# Patient Record
Sex: MALE | Race: WHITE | Employment: FULL TIME | URBAN - METROPOLITAN AREA
[De-identification: names, ages, dates, MRNs, and addresses within clinical notes are randomized per-mention and may not be internally consistent; named-entity substitution may affect disease eponyms.]

---

## 2019-06-13 ENCOUNTER — APPOINTMENT (OUTPATIENT)
Dept: GENERAL RADIOLOGY | Age: 44
End: 2019-06-13
Payer: COMMERCIAL

## 2019-06-13 ENCOUNTER — APPOINTMENT (OUTPATIENT)
Dept: CT IMAGING | Age: 44
End: 2019-06-13
Payer: COMMERCIAL

## 2019-06-13 ENCOUNTER — HOSPITAL ENCOUNTER (EMERGENCY)
Age: 44
Discharge: HOME | End: 2019-06-13
Attending: EMERGENCY MEDICINE
Payer: COMMERCIAL

## 2019-06-13 VITALS
SYSTOLIC BLOOD PRESSURE: 140 MMHG | WEIGHT: 230 LBS | HEIGHT: 71 IN | TEMPERATURE: 98.2 F | HEART RATE: 72 BPM | OXYGEN SATURATION: 97 % | RESPIRATION RATE: 14 BRPM | DIASTOLIC BLOOD PRESSURE: 96 MMHG | BODY MASS INDEX: 32.2 KG/M2

## 2019-06-13 DIAGNOSIS — S63.501A SPRAIN OF RIGHT WRIST, INITIAL ENCOUNTER: ICD-10-CM

## 2019-06-13 DIAGNOSIS — S80.01XA CONTUSION OF RIGHT KNEE, INITIAL ENCOUNTER: ICD-10-CM

## 2019-06-13 DIAGNOSIS — S06.0X1A CONCUSSION WITH LOSS OF CONSCIOUSNESS OF 30 MINUTES OR LESS, INITIAL ENCOUNTER: ICD-10-CM
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
## 2019-06-13 DIAGNOSIS — S00.03XA CONTUSION OF SCALP, INITIAL ENCOUNTER: ICD-10-CM

## 2019-06-13 LAB
ABO/RH: NORMAL
ALBUMIN SERPL-MCNC: 4 GM/DL (ref 3.4–5)
ALP BLD-CCNC: 68 IU/L (ref 40–129)
ALT SERPL-CCNC: 27 U/L (ref 10–40)
ANION GAP SERPL CALCULATED.3IONS-SCNC: 13 MMOL/L (ref 4–16)
ANTIBODY SCREEN: NEGATIVE
AST SERPL-CCNC: 20 IU/L (ref 15–37)
BASOPHILS ABSOLUTE: 0.1 K/CU MM
BASOPHILS RELATIVE PERCENT: 1 % (ref 0–1)
BILIRUB SERPL-MCNC: 0.6 MG/DL (ref 0–1)
BUN BLDV-MCNC: 18 MG/DL (ref 6–23)
CALCIUM SERPL-MCNC: 9.2 MG/DL (ref 8.3–10.6)
CHLORIDE BLD-SCNC: 99 MMOL/L (ref 99–110)
CO2: 22 MMOL/L (ref 21–32)
CREAT SERPL-MCNC: 1 MG/DL (ref 0.9–1.3)
DIFFERENTIAL TYPE: ABNORMAL
EOSINOPHILS ABSOLUTE: 0.2 K/CU MM
EOSINOPHILS RELATIVE PERCENT: 2.4 % (ref 0–3)
GFR AFRICAN AMERICAN: >60 ML/MIN/1.73M2
GFR NON-AFRICAN AMERICAN: >60 ML/MIN/1.73M2
GLUCOSE BLD-MCNC: 364 MG/DL (ref 70–99)
HCT VFR BLD CALC: 41 % (ref 42–52)
HEMOGLOBIN: 14.7 GM/DL (ref 13.5–18)
IMMATURE NEUTROPHIL %: 0.6 % (ref 0–0.43)
INR BLD: 1.08 INDEX
LIPASE: 26 IU/L (ref 13–60)
LYMPHOCYTES ABSOLUTE: 1.7 K/CU MM
LYMPHOCYTES RELATIVE PERCENT: 23.4 % (ref 24–44)
MCH RBC QN AUTO: 31 PG (ref 27–31)
MCHC RBC AUTO-ENTMCNC: 35.9 % (ref 32–36)
MCV RBC AUTO: 86.5 FL (ref 78–100)
MONOCYTES ABSOLUTE: 0.4 K/CU MM
MONOCYTES RELATIVE PERCENT: 6 % (ref 0–4)
NUCLEATED RBC %: 0 %
PDW BLD-RTO: 11.7 % (ref 11.7–14.9)
PLATELET # BLD: 221 K/CU MM (ref 140–440)
PMV BLD AUTO: 9.3 FL (ref 7.5–11.1)
POTASSIUM SERPL-SCNC: 3.8 MMOL/L (ref 3.5–5.1)
PROTHROMBIN TIME: 12.3 SECONDS (ref 9.12–12.5)
RBC # BLD: 4.74 M/CU MM (ref 4.6–6.2)
SEGMENTED NEUTROPHILS ABSOLUTE COUNT: 4.8 K/CU MM
SEGMENTED NEUTROPHILS RELATIVE PERCENT: 66.6 % (ref 36–66)
SODIUM BLD-SCNC: 134 MMOL/L (ref 135–145)
TOTAL IMMATURE NEUTOROPHIL: 0.04 K/CU MM
TOTAL NUCLEATED RBC: 0 K/CU MM
TOTAL PROTEIN: 6.5 GM/DL (ref 6.4–8.2)
WBC # BLD: 7.2 K/CU MM (ref 4–10.5)

## 2019-06-13 PROCEDURE — 72170 X-RAY EXAM OF PELVIS: CPT

## 2019-06-13 PROCEDURE — 36415 COLL VENOUS BLD VENIPUNCTURE: CPT

## 2019-06-13 PROCEDURE — 6360000004 HC RX CONTRAST MEDICATION: Performed by: EMERGENCY MEDICINE

## 2019-06-13 PROCEDURE — 83690 ASSAY OF LIPASE: CPT

## 2019-06-13 PROCEDURE — 85610 PROTHROMBIN TIME: CPT

## 2019-06-13 PROCEDURE — 86900 BLOOD TYPING SEROLOGIC ABO: CPT

## 2019-06-13 PROCEDURE — 73560 X-RAY EXAM OF KNEE 1 OR 2: CPT

## 2019-06-13 PROCEDURE — 85025 COMPLETE CBC W/AUTO DIFF WBC: CPT

## 2019-06-13 PROCEDURE — 73110 X-RAY EXAM OF WRIST: CPT

## 2019-06-13 PROCEDURE — 74177 CT ABD & PELVIS W/CONTRAST: CPT

## 2019-06-13 PROCEDURE — 70450 CT HEAD/BRAIN W/O DYE: CPT

## 2019-06-13 PROCEDURE — 86850 RBC ANTIBODY SCREEN: CPT

## 2019-06-13 PROCEDURE — 86901 BLOOD TYPING SEROLOGIC RH(D): CPT

## 2019-06-13 PROCEDURE — 6370000000 HC RX 637 (ALT 250 FOR IP): Performed by: EMERGENCY MEDICINE

## 2019-06-13 PROCEDURE — 72131 CT LUMBAR SPINE W/O DYE: CPT

## 2019-06-13 PROCEDURE — 72125 CT NECK SPINE W/O DYE: CPT

## 2019-06-13 PROCEDURE — 72128 CT CHEST SPINE W/O DYE: CPT

## 2019-06-13 PROCEDURE — 99284 EMERGENCY DEPT VISIT MOD MDM: CPT

## 2019-06-13 PROCEDURE — 71045 X-RAY EXAM CHEST 1 VIEW: CPT

## 2019-06-13 PROCEDURE — 71275 CT ANGIOGRAPHY CHEST: CPT

## 2019-06-13 PROCEDURE — 80053 COMPREHEN METABOLIC PANEL: CPT

## 2019-06-13 RX ORDER — OMEPRAZOLE 40 MG/1
40 CAPSULE, DELAYED RELEASE ORAL DAILY
COMMUNITY

## 2019-06-13 RX ORDER — HYDROCODONE BITARTRATE AND ACETAMINOPHEN 5; 325 MG/1; MG/1
1 TABLET ORAL ONCE
Status: COMPLETED | OUTPATIENT
Start: 2019-06-13 | End: 2019-06-13

## 2019-06-13 RX ORDER — CYCLOBENZAPRINE HCL 10 MG
10 TABLET ORAL 3 TIMES DAILY PRN
COMMUNITY

## 2019-06-13 RX ORDER — FENOFIBRATE 160 MG/1
160 TABLET ORAL DAILY
COMMUNITY

## 2019-06-13 RX ORDER — OMEGA-3-ACID ETHYL ESTERS 1 G/1
2 CAPSULE, LIQUID FILLED ORAL DAILY
COMMUNITY

## 2019-06-13 RX ORDER — NAPROXEN 500 MG/1
500 TABLET ORAL 2 TIMES DAILY PRN
Qty: 60 TABLET | Refills: 0 | Status: SHIPPED | OUTPATIENT
Start: 2019-06-13

## 2019-06-13 RX ADMIN — HYDROCODONE BITARTRATE AND ACETAMINOPHEN 1 TABLET: 5; 325 TABLET ORAL at 15:33

## 2019-06-13 RX ADMIN — IOPAMIDOL 80 ML: 755 INJECTION, SOLUTION INTRAVENOUS at 14:01

## 2019-06-13 ASSESSMENT — PAIN SCALES - GENERAL
PAINLEVEL_OUTOF10: 8
PAINLEVEL_OUTOF10: 10

## 2019-06-13 ASSESSMENT — PAIN DESCRIPTION - PAIN TYPE: TYPE: ACUTE PAIN
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
## 2019-06-13 NOTE — ED NOTES
Pt returned from CT at this time. Pt speaking in full sentences at this time.       Mic Castro RN  06/13/19 [id number]

## 2019-06-13 NOTE — PROGRESS NOTES
Medication History  [facility]    Patient Name: Merissa Carmichael 1975     Medication history has been completed by: Ronda Hebert CPhT    Source(s) of information: Patient and Insurance claims    Primary Care Physician: No primary care provider on file. Pharmacy: [facility]    Allergies as of 06/13/2019    (No Known Allergies)        Prior to Admission medications    Medication Sig Start Date End Date Taking? Authorizing Provider   metFORMIN (GLUCOPHAGE) 500 MG tablet Take 500 mg by mouth 2 times daily (with meals)   Yes Historical Provider, MD   fenofibrate 160 MG tablet Take 160 mg by mouth daily   Yes Historical Provider, MD   omega-3 acid ethyl esters (LOVAZA) 1 g capsule Take 2 g by mouth daily   Yes Historical Provider, MD   omeprazole (PRILOSEC) 40 MG delayed release capsule Take 40 mg by mouth daily   Yes Historical Provider, MD   cyclobenzaprine (FLEXERIL) 10 MG tablet Take 10 mg by mouth 3 times daily as needed for Muscle spasms   Yes Historical Provider, MD       Medications added or changed (ex. new medication, dosage change, interval change, formulation change):   Entire medication list    Other Comments:  Reviewed and updated med list per patient and verified with claims    To my knowledge the above medication history is accurate as of 6/13/2019 2:09 PM.   Ronda Hebert CPhT   6/13/2019 2:09 PM
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
## 2019-06-13 NOTE — ED NOTES
Pt returned from CT. Pt reporting \"pain all over\" especially reporting a headache, right knee, and right hand pain. Pt also reporting neck and back pain. Pt describes a \"burning\" sensation in his palm and thumb region. Pt reports full sensation in extremities otherwise. Strong pedal pulses palpated. Strong radial pulses. Pt reports shortness of breath at this time. Respiratory rate of 25 at this time. Oxygen sat 98% on 2L NC. Chest rise symmetrical. Pt denying chest pain at this time. Sinus rhythm on the monitor with HR 67. Pt current /79 with MAP of 98. No obvious deformities noted. No bleeding noted. Redness noted to right knee and very tender to the touch. Pt does report some blurred vision. PERRLA. Pt alert and oriented to person, place, time, and situation. Speaking in clear and full sentences.       Keke Adams RN  06/13/19 8645
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
## 2019-06-13 NOTE — ED NOTES
Pt alert at this time. Pupils equal and reactive. Portable radiology imaging completed and patient transported to CT at this time. Pt in c-collar.      Amanda Rivas RN  06/13/19 [address], RN  06/13/19 1330

## 2019-06-13 NOTE — LETTER
[facility]  [place] [id number]  Phone: [phone]  Fax: [phone]               June 13, 2019    Patient: Penny Javed   YOB: 1975   Date of Visit: 6/13/2019       To Whom It May Concern:    Penny Javed was seen and treated in our emergency department on 6/13/2019. He may return to work on 6/16/2019.       Sincerely,       Stefani VASQUEZ        Signature:__________________________________
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
## 2019-09-30 ENCOUNTER — HOSPITAL ENCOUNTER (EMERGENCY)
Age: 44
Discharge: HOME | End: 2019-09-30
Payer: COMMERCIAL

## 2019-09-30 ENCOUNTER — APPOINTMENT (OUTPATIENT)
Dept: GENERAL RADIOLOGY | Age: 44
End: 2019-09-30
Payer: COMMERCIAL

## 2019-09-30 VITALS
TEMPERATURE: 98.3 F | DIASTOLIC BLOOD PRESSURE: 97 MMHG | HEIGHT: 71 IN | WEIGHT: 230 LBS | RESPIRATION RATE: 18 BRPM | OXYGEN SATURATION: 99 % | SYSTOLIC BLOOD PRESSURE: 138 MMHG | BODY MASS INDEX: 32.2 KG/M2 | HEART RATE: 87 BPM

## 2019-09-30 DIAGNOSIS — S61.512A LACERATION OF LEFT WRIST, INITIAL ENCOUNTER: Primary | ICD-10-CM

## 2019-09-30 PROCEDURE — 6370000000 HC RX 637 (ALT 250 FOR IP): Performed by: PHYSICIAN ASSISTANT

## 2019-09-30 PROCEDURE — 73110 X-RAY EXAM OF WRIST: CPT

## 2019-09-30 PROCEDURE — 4500000027

## 2019-09-30 PROCEDURE — 2500000003 HC RX 250 WO HCPCS: Performed by: PHYSICIAN ASSISTANT

## 2019-09-30 PROCEDURE — 99283 EMERGENCY DEPT VISIT LOW MDM: CPT

## 2019-09-30 RX ORDER — LIDOCAINE HYDROCHLORIDE 20 MG/ML
10 INJECTION, SOLUTION INFILTRATION; PERINEURAL ONCE
Status: COMPLETED | OUTPATIENT
Start: 2019-09-30 | End: 2019-09-30

## 2019-09-30 RX ADMIN — LIDOCAINE HYDROCHLORIDE 10 ML: 20 INJECTION, SOLUTION INFILTRATION; PERINEURAL at 13:18

## 2019-09-30 RX ADMIN — Medication 3 ML: at 12:17

## 2019-09-30 ASSESSMENT — PAIN DESCRIPTION - PAIN TYPE: TYPE: ACUTE PAIN

## 2019-09-30 ASSESSMENT — PAIN SCALES - GENERAL: PAINLEVEL_OUTOF10: 6

## 2019-09-30 ASSESSMENT — PAIN DESCRIPTION - LOCATION: LOCATION: WRIST

## 2019-09-30 ASSESSMENT — PAIN DESCRIPTION - ORIENTATION: ORIENTATION: LEFT

## 2023-05-10 ENCOUNTER — HOSPITAL ENCOUNTER (EMERGENCY)
Age: 48
Discharge: HOME | End: 2023-05-10
Attending: EMERGENCY MEDICINE
Payer: COMMERCIAL

## 2023-05-10 ENCOUNTER — APPOINTMENT (OUTPATIENT)
Dept: GENERAL RADIOLOGY | Age: 48
End: 2023-05-10
Payer: COMMERCIAL

## 2023-05-10 VITALS
HEIGHT: 71 IN | RESPIRATION RATE: 18 BRPM | WEIGHT: 220 LBS | DIASTOLIC BLOOD PRESSURE: 79 MMHG | SYSTOLIC BLOOD PRESSURE: 117 MMHG | BODY MASS INDEX: 30.8 KG/M2 | OXYGEN SATURATION: 96 % | TEMPERATURE: 97.8 F | HEART RATE: 83 BPM

## 2023-05-10 DIAGNOSIS — M54.9 BACK PAIN, UNSPECIFIED BACK LOCATION, UNSPECIFIED BACK PAIN LATERALITY, UNSPECIFIED CHRONICITY: Primary | ICD-10-CM

## 2023-05-10 DIAGNOSIS — M51.37 DDD (DEGENERATIVE DISC DISEASE), LUMBOSACRAL: ICD-10-CM

## 2023-05-10 PROCEDURE — 99284 EMERGENCY DEPT VISIT MOD MDM: CPT

## 2023-05-10 PROCEDURE — 72100 X-RAY EXAM L-S SPINE 2/3 VWS: CPT

## 2023-05-10 PROCEDURE — 6360000002 HC RX W HCPCS: Performed by: EMERGENCY MEDICINE

## 2023-05-10 PROCEDURE — 96372 THER/PROPH/DIAG INJ SC/IM: CPT

## 2023-05-10 PROCEDURE — 6370000000 HC RX 637 (ALT 250 FOR IP): Performed by: EMERGENCY MEDICINE

## 2023-05-10 RX ORDER — ACETAMINOPHEN 325 MG/1
650 TABLET ORAL EVERY 6 HOURS PRN
Qty: 120 TABLET | Refills: 3 | Status: SHIPPED | OUTPATIENT
Start: 2023-05-10

## 2023-05-10 RX ORDER — KETOROLAC TROMETHAMINE 30 MG/ML
30 INJECTION, SOLUTION INTRAMUSCULAR; INTRAVENOUS ONCE
Status: COMPLETED | OUTPATIENT
Start: 2023-05-10 | End: 2023-05-10

## 2023-05-10 RX ORDER — LIDOCAINE 4 G/G
1 PATCH TOPICAL DAILY
Status: DISCONTINUED | OUTPATIENT
Start: 2023-05-10 | End: 2023-05-10 | Stop reason: HOSPADM

## 2023-05-10 RX ORDER — ACETAMINOPHEN 500 MG
1000 TABLET ORAL ONCE
Status: COMPLETED | OUTPATIENT
Start: 2023-05-10 | End: 2023-05-10

## 2023-05-10 RX ORDER — CYCLOBENZAPRINE HCL 10 MG
10 TABLET ORAL ONCE
Status: COMPLETED | OUTPATIENT
Start: 2023-05-10 | End: 2023-05-10

## 2023-05-10 RX ORDER — CYCLOBENZAPRINE HCL 10 MG
10 TABLET ORAL NIGHTLY PRN
Qty: 10 TABLET | Refills: 0 | Status: SHIPPED | OUTPATIENT
Start: 2023-05-10 | End: 2023-05-20

## 2023-05-10 RX ORDER — NAPROXEN 500 MG/1
500 TABLET ORAL 2 TIMES DAILY
Qty: 60 TABLET | Refills: 0 | Status: SHIPPED | OUTPATIENT
Start: 2023-05-10

## 2023-05-10 RX ORDER — LIDOCAINE 4 G/G
1 PATCH TOPICAL DAILY
Qty: 30 PATCH | Refills: 0 | Status: SHIPPED | OUTPATIENT
Start: 2023-05-10 | End: 2023-06-09

## 2023-05-10 RX ADMIN — KETOROLAC TROMETHAMINE 30 MG: 30 INJECTION, SOLUTION INTRAMUSCULAR; INTRAVENOUS at 13:16

## 2023-05-10 RX ADMIN — CYCLOBENZAPRINE 10 MG: 10 TABLET, FILM COATED ORAL at 13:13

## 2023-05-10 RX ADMIN — ACETAMINOPHEN 1000 MG: 500 TABLET ORAL at 13:13

## 2023-05-10 ASSESSMENT — PAIN DESCRIPTION - DESCRIPTORS: DESCRIPTORS: SHARP

## 2023-05-10 ASSESSMENT — ENCOUNTER SYMPTOMS
RESPIRATORY NEGATIVE: 1
EYES NEGATIVE: 1
ALLERGIC/IMMUNOLOGIC NEGATIVE: 1
BACK PAIN: 1
GASTROINTESTINAL NEGATIVE: 1

## 2023-05-10 ASSESSMENT — PAIN DESCRIPTION - ORIENTATION: ORIENTATION: RIGHT

## 2023-05-10 ASSESSMENT — PAIN DESCRIPTION - LOCATION
LOCATION: BACK
LOCATION: BACK

## 2023-05-10 ASSESSMENT — PAIN - FUNCTIONAL ASSESSMENT: PAIN_FUNCTIONAL_ASSESSMENT: PREVENTS OR INTERFERES WITH ALL ACTIVE AND SOME PASSIVE ACTIVITIES

## 2023-05-10 ASSESSMENT — PAIN SCALES - GENERAL
PAINLEVEL_OUTOF10: 10
PAINLEVEL_OUTOF10: 8

## 2023-05-10 NOTE — ED NOTES
Discharge instructions given, pt informed prescriptions were sent to pharmacy, he voiced understanding. Ambulatory to exit without incident.      Alex Mcgregor, RN  05/10/23 7639

## 2023-05-10 NOTE — ED NOTES
Pt to bed from waiting room, Dr. Jeramy Adame at bedside for initial exam     Dania Nugent RN  05/10/23 4002

## 2023-05-10 NOTE — ED PROVIDER NOTES
(if obtained):  [] The following radiograph was interpreted by myself in the absence of a radiologist:  [x] Radiologist's Report Reviewed:    Xray L spine    EKG (if obtained): (All EKG's are interpreted by myself in the absence of a cardiologist)    MDM:    Patient here with complaint of low back pain sciatica. Again patient states 2 days ago he was moving into a new house when he bent down to look for rodents that were in the house and he states he heard a pop in his back. Ever since and having right low back pain with radiation down his right leg. History of sciatica down his left leg. He denies any fevers nausea vomiting chest pain shortness of breath weakness bowel bladder incontinence saddle anesthesia. No IV drug abuse no history of back surgeries no history of cancer. He has been taking ibuprofen no relief. Constant pain hurts to move and touch. On exam he appears well vital signs are stable on exam he does have right low back pain there is no rashes no trauma no erythema or swelling he has a positive straight leg test on the right paresthesias subjectively down right leg. He has 2 pulses no compartment syndrome signs no weakness. Again no bowel bladder incontinence no saddle anesthesia. Currently in a freestanding ER I do not have MRI here I will check x-ray of his L-spine I will give him Toradol Tylenol Flexeril Lidoderm patch. Likely sciatica, lumbar strain, sprain, herniated disc he does have a history of herniated disc in his L-spine x3. He has no other red flag symptoms. Patient recheck doing well.   X-ray shows degenerative disc disease no obvious fracture etc.  I do think patient likely has sciatica, herniated disc I do not have MRI here I do not think needs emergent MRI healthy has cauda equina epidural abscess hematoma osteomyelitis etc.  Patient otherwise stable well-appearing discharge home with Flexeril Naprosyn Tylenol Lidoderm patch he was given return precautions and follow-up

## 2023-11-10 ENCOUNTER — HOSPITAL ENCOUNTER (EMERGENCY)
Age: 48
Discharge: HOME | End: 2023-11-10
Attending: EMERGENCY MEDICINE
Payer: COMMERCIAL

## 2023-11-10 ENCOUNTER — APPOINTMENT (OUTPATIENT)
Dept: GENERAL RADIOLOGY | Age: 48
End: 2023-11-10
Payer: COMMERCIAL

## 2023-11-10 VITALS
WEIGHT: 230 LBS | HEIGHT: 71 IN | DIASTOLIC BLOOD PRESSURE: 87 MMHG | HEART RATE: 89 BPM | SYSTOLIC BLOOD PRESSURE: 161 MMHG | BODY MASS INDEX: 32.2 KG/M2 | OXYGEN SATURATION: 100 % | TEMPERATURE: 97.9 F | RESPIRATION RATE: 16 BRPM

## 2023-11-10 DIAGNOSIS — S90.111A CONTUSION OF RIGHT GREAT TOE WITHOUT DAMAGE TO NAIL, INITIAL ENCOUNTER: Primary | ICD-10-CM

## 2023-11-10 PROCEDURE — 99283 EMERGENCY DEPT VISIT LOW MDM: CPT

## 2023-11-10 PROCEDURE — 73660 X-RAY EXAM OF TOE(S): CPT

## 2023-11-10 ASSESSMENT — ENCOUNTER SYMPTOMS
RHINORRHEA: 0
VOICE CHANGE: 0
EYE REDNESS: 0
CHEST TIGHTNESS: 0
ABDOMINAL DISTENTION: 0
DIARRHEA: 0
SINUS PRESSURE: 0
NAUSEA: 0
BACK PAIN: 0
BLOOD IN STOOL: 0
CONSTIPATION: 0
EYE DISCHARGE: 0
VOMITING: 0
SORE THROAT: 0
ANAL BLEEDING: 0
EYE ITCHING: 0
ABDOMINAL PAIN: 0
TROUBLE SWALLOWING: 0
WHEEZING: 0
EYE PAIN: 0
STRIDOR: 0
PHOTOPHOBIA: 0
FACIAL SWELLING: 0
COUGH: 0
SHORTNESS OF BREATH: 0

## 2023-11-10 NOTE — DISCHARGE INSTRUCTIONS
Weight bearing and activity as tolerated. Tylenol and/or Ibuprofen as needed, as directed for pain. Watch for evidence of infection and return if any further problems or concerns.

## 2023-11-10 NOTE — ED NOTES
Pt reports he dropped a piece of metal on his rt foot yesterday , swelling and bruse noted.  Pt concerned due to his diabeties     Dayana Hoffman RN  11/10/23 2195

## 2023-11-10 NOTE — ED PROVIDER NOTES
Smokeless tobacco: Never   Substance and Sexual Activity    Alcohol use: Yes     Comment: Occ    Drug use: Yes     Types: Marijuana [place])    Sexual activity: Not on file   Other Topics Concern    Not on file   Social History Narrative    Not on file     Social Determinants of Health     Financial Resource Strain: Not on file   Food Insecurity: Not on file   Transportation Needs: Not on file   Physical Activity: Not on file   Stress: Not on file   Social Connections: Not on file   Intimate Partner Violence: Not on file   Housing Stability: Not on file         Review of Systems   Constitutional:  Negative for activity change, appetite change, chills, diaphoresis, fatigue and fever. HENT: Negative. Negative for congestion, dental problem, ear pain, facial swelling, rhinorrhea, sinus pressure, sneezing, sore throat, tinnitus, trouble swallowing and voice change. Eyes:  Negative for photophobia, pain, discharge, redness, itching and visual disturbance. Respiratory:  Negative for cough, chest tightness, shortness of breath, wheezing and stridor. Cardiovascular:  Negative for chest pain, palpitations and leg swelling. Gastrointestinal:  Negative for abdominal distention, abdominal pain, anal bleeding, blood in stool, constipation, diarrhea, nausea and vomiting. Genitourinary:  Negative for difficulty urinating, dysuria, frequency, hematuria, penile discharge, testicular pain and urgency. Musculoskeletal:  Positive for arthralgias (right great toe). Negative for back pain, joint swelling, neck pain and neck stiffness. Skin:  Negative for rash and wound. Neurological:  Negative for dizziness, syncope, facial asymmetry, speech difficulty, weakness, numbness and headaches. Hematological:  Does not bruise/bleed easily. Psychiatric/Behavioral:  Negative for agitation, confusion, hallucinations, self-injury, sleep disturbance and suicidal ideas. The patient is not nervous/anxious.     All other systems

## 2024-04-10 ENCOUNTER — HOSPITAL ENCOUNTER (EMERGENCY)
Age: 49
Discharge: HOME | End: 2024-04-10
Attending: EMERGENCY MEDICINE
Payer: COMMERCIAL

## 2024-04-10 VITALS
DIASTOLIC BLOOD PRESSURE: 87 MMHG | HEIGHT: 71 IN | BODY MASS INDEX: 32.2 KG/M2 | OXYGEN SATURATION: 99 % | WEIGHT: 230 LBS | HEART RATE: 83 BPM | RESPIRATION RATE: 16 BRPM | TEMPERATURE: 97.2 F | SYSTOLIC BLOOD PRESSURE: 143 MMHG

## 2024-04-10 DIAGNOSIS — S90.822A BLISTER OF LEFT FOOT, INITIAL ENCOUNTER: Primary | ICD-10-CM

## 2024-04-10 PROCEDURE — 99283 EMERGENCY DEPT VISIT LOW MDM: CPT

## 2024-04-10 RX ORDER — GLIPIZIDE 5 MG/1
5 TABLET ORAL
COMMUNITY

## 2024-04-10 ASSESSMENT — LIFESTYLE VARIABLES
HOW OFTEN DO YOU HAVE A DRINK CONTAINING ALCOHOL: NEVER
HOW MANY STANDARD DRINKS CONTAINING ALCOHOL DO YOU HAVE ON A TYPICAL DAY: PATIENT DOES NOT DRINK

## 2024-04-10 ASSESSMENT — PAIN DESCRIPTION - ORIENTATION: ORIENTATION: LEFT

## 2024-04-10 ASSESSMENT — PAIN SCALES - GENERAL: PAINLEVEL_OUTOF10: 10

## 2024-04-10 ASSESSMENT — PAIN - FUNCTIONAL ASSESSMENT: PAIN_FUNCTIONAL_ASSESSMENT: 0-10

## 2024-04-10 ASSESSMENT — PAIN DESCRIPTION - LOCATION: LOCATION: FOOT

## 2024-04-12 NOTE — ED PROVIDER NOTES
No data to display    Independent Imaging Interpretation by me: NA    Chronic conditions affecting care: DM    Discussion with Other Profesionals : None    Social Determinants : None    Records Reviewed : None    Disposition Considerations (tests considered but not done, Shared Decision Making, Pt Expectation of Test or Tx.):   Appropriate for outpatient management      I discussed specific signs and symptoms and when to return to the emergency department as well as need for close outpatient follow-up.  Questions sought and answered with the patient and significant other. They voice understanding and agree with plan.    I am the Primary Clinician of Record.            Clinical Impression:  1. Blister of left foot, initial encounter      Disposition referral (if applicable):  Nash Swann PA  [address]  [facility] [id number] [phone]    Schedule an appointment as soon as possible for a visit       [facility]  [address] [facility] [address]  [phone]  Today  If symptoms worsen    Disposition medications (if applicable):  Discharge Medication List as of 4/10/2024  2:54 PM        START taking these medications    Details   Adhesive Bandages (MOLESKIN FOAM) PADS 2 times daily Starting Wed 4/10/2024, Disp-30 each, R-0, Normal      Bacitracin-Polymyxin B (NEOSPORIN) [id number] UNIT/GM OINT Apply 1 g topically in the morning and at bedtime, Disp-14.2 g, R-3Normal             Comment: Please note this report has been produced using speech recognition software and may contain errors related to that system including errors in grammar, punctuation, and spelling, as well as words and phrases that may be inappropriate. If there are any questions or concerns please feel free to contact the dictating provider for clarification.         Fermín Dunne MD  04/11/24 0766

## 2025-02-22 ENCOUNTER — HOSPITAL ENCOUNTER (OUTPATIENT)
Age: 50
Setting detail: OBSERVATION
Discharge: HOME | End: 2025-02-23
Attending: EMERGENCY MEDICINE | Admitting: HOSPITALIST
Payer: COMMERCIAL

## 2025-02-22 ENCOUNTER — APPOINTMENT (OUTPATIENT)
Dept: CT IMAGING | Age: 50
End: 2025-02-22
Payer: COMMERCIAL

## 2025-02-22 DIAGNOSIS — R07.9 ACUTE CHEST PAIN: Primary | ICD-10-CM

## 2025-02-22 DIAGNOSIS — K22.89 ESOPHAGEAL THICKENING: ICD-10-CM

## 2025-02-22 DIAGNOSIS — R07.9 CHEST PAIN, UNSPECIFIED TYPE: ICD-10-CM

## 2025-02-22 DIAGNOSIS — R94.31 ABNORMAL EKG: ICD-10-CM

## 2025-02-22 LAB
ALBUMIN SERPL-MCNC: 4.6 G/DL (ref 3.4–5)
ALBUMIN/GLOB SERPL: 1.7 {RATIO} (ref 1.1–2.2)
ALP SERPL-CCNC: 74 U/L (ref 40–129)
ALT SERPL-CCNC: 30 U/L (ref 10–40)
ANION GAP SERPL CALCULATED.3IONS-SCNC: 17 MMOL/L (ref 4–16)
AST SERPL-CCNC: 17 U/L (ref 15–37)
BASOPHILS # BLD: 0.08 K/UL
BASOPHILS NFR BLD: 1 % (ref 0–1)
BILIRUB SERPL-MCNC: 1 MG/DL (ref 0–1)
BUN SERPL-MCNC: 21 MG/DL (ref 6–23)
CALCIUM SERPL-MCNC: 9.4 MG/DL (ref 8.3–10.6)
CHLORIDE SERPL-SCNC: 93 MMOL/L (ref 99–110)
CHP ED QC CHECK: YES
CO2 SERPL-SCNC: 25 MMOL/L (ref 21–32)
CREAT SERPL-MCNC: 1.1 MG/DL (ref 0.9–1.3)
EKG ATRIAL RATE: 101 BPM
EKG DIAGNOSIS: NORMAL
EKG P AXIS: 69 DEGREES
EKG P-R INTERVAL: 144 MS
EKG Q-T INTERVAL: 370 MS
EKG QRS DURATION: 78 MS
EKG QTC CALCULATION (BAZETT): 479 MS
EKG R AXIS: 71 DEGREES
EKG T AXIS: 106 DEGREES
EKG VENTRICULAR RATE: 101 BPM
EOSINOPHIL # BLD: 0.25 K/UL
EOSINOPHILS RELATIVE PERCENT: 2 % (ref 0–3)
ERYTHROCYTE [DISTWIDTH] IN BLOOD BY AUTOMATED COUNT: 12.1 % (ref 11.7–14.9)
GFR, ESTIMATED: 83 ML/MIN/1.73M2
GLUCOSE BLD-MCNC: 337 MG/DL (ref 74–99)
GLUCOSE BLD-MCNC: 349 MG/DL (ref 74–99)
GLUCOSE BLD-MCNC: 373 MG/DL
GLUCOSE BLD-MCNC: 373 MG/DL (ref 74–99)
GLUCOSE SERPL-MCNC: 420 MG/DL (ref 74–99)
HCT VFR BLD AUTO: 44.2 % (ref 42–52)
HCT VFR BLD AUTO: 46.9 % (ref 42–52)
HGB BLD-MCNC: 15.6 G/DL (ref 13.5–18)
HGB BLD-MCNC: 16.9 G/DL (ref 13.5–18)
IMM GRANULOCYTES # BLD AUTO: 0.03 K/UL
IMM GRANULOCYTES NFR BLD: 0 %
INR PPP: 1
LIPASE SERPL-CCNC: 24 U/L (ref 13–60)
LYMPHOCYTES NFR BLD: 3.15 K/UL
LYMPHOCYTES RELATIVE PERCENT: 31 % (ref 24–44)
MAGNESIUM SERPL-MCNC: 1.8 MG/DL (ref 1.8–2.4)
MCH RBC QN AUTO: 31.5 PG (ref 27–31)
MCHC RBC AUTO-ENTMCNC: 36 G/DL (ref 32–36)
MCV RBC AUTO: 87.3 FL (ref 78–100)
MONOCYTES NFR BLD: 0.67 K/UL
MONOCYTES NFR BLD: 7 % (ref 0–4)
NEUTROPHILS NFR BLD: 59 % (ref 36–66)
NEUTS SEG NFR BLD: 6.11 K/UL
PLATELET # BLD AUTO: 298 K/UL (ref 140–440)
PMV BLD AUTO: 8.9 FL (ref 7.5–11.1)
POTASSIUM SERPL-SCNC: 4.1 MMOL/L (ref 3.5–5.1)
PROT SERPL-MCNC: 7.3 G/DL (ref 6.4–8.2)
PROTHROMBIN TIME: 13.3 SEC (ref 11.7–14.5)
RBC # BLD AUTO: 5.37 M/UL (ref 4.6–6.2)
SODIUM SERPL-SCNC: 135 MMOL/L (ref 135–145)
TROPONIN I SERPL HS-MCNC: 13 NG/L (ref 0–22)
TROPONIN I SERPL HS-MCNC: 14 NG/L (ref 0–21)
TROPONIN I SERPL HS-MCNC: 14 NG/L (ref 0–22)
TROPONIN I SERPL HS-MCNC: 15 NG/L (ref 0–21)
WBC OTHER # BLD: 10.3 K/UL (ref 4–10.5)

## 2025-02-22 PROCEDURE — 82962 GLUCOSE BLOOD TEST: CPT

## 2025-02-22 PROCEDURE — 6370000000 HC RX 637 (ALT 250 FOR IP): Performed by: EMERGENCY MEDICINE

## 2025-02-22 PROCEDURE — 93005 ELECTROCARDIOGRAM TRACING: CPT | Performed by: EMERGENCY MEDICINE

## 2025-02-22 PROCEDURE — G0378 HOSPITAL OBSERVATION PER HR: HCPCS

## 2025-02-22 PROCEDURE — 83735 ASSAY OF MAGNESIUM: CPT

## 2025-02-22 PROCEDURE — 6360000004 HC RX CONTRAST MEDICATION: Performed by: EMERGENCY MEDICINE

## 2025-02-22 PROCEDURE — 71260 CT THORAX DX C+: CPT

## 2025-02-22 PROCEDURE — 84484 ASSAY OF TROPONIN QUANT: CPT

## 2025-02-22 PROCEDURE — 96376 TX/PRO/DX INJ SAME DRUG ADON: CPT

## 2025-02-22 PROCEDURE — 80053 COMPREHEN METABOLIC PANEL: CPT

## 2025-02-22 PROCEDURE — 2580000003 HC RX 258: Performed by: EMERGENCY MEDICINE

## 2025-02-22 PROCEDURE — 83690 ASSAY OF LIPASE: CPT

## 2025-02-22 PROCEDURE — 85018 HEMOGLOBIN: CPT

## 2025-02-22 PROCEDURE — 96361 HYDRATE IV INFUSION ADD-ON: CPT

## 2025-02-22 PROCEDURE — 85014 HEMATOCRIT: CPT

## 2025-02-22 PROCEDURE — 93010 ELECTROCARDIOGRAM REPORT: CPT | Performed by: INTERNAL MEDICINE

## 2025-02-22 PROCEDURE — 6370000000 HC RX 637 (ALT 250 FOR IP): Performed by: PHYSICIAN ASSISTANT

## 2025-02-22 PROCEDURE — 96374 THER/PROPH/DIAG INJ IV PUSH: CPT

## 2025-02-22 PROCEDURE — 6370000000 HC RX 637 (ALT 250 FOR IP): Performed by: HOSPITALIST

## 2025-02-22 PROCEDURE — 36415 COLL VENOUS BLD VENIPUNCTURE: CPT

## 2025-02-22 PROCEDURE — 99285 EMERGENCY DEPT VISIT HI MDM: CPT

## 2025-02-22 PROCEDURE — 6360000002 HC RX W HCPCS: Performed by: PHYSICIAN ASSISTANT

## 2025-02-22 PROCEDURE — 82947 ASSAY GLUCOSE BLOOD QUANT: CPT

## 2025-02-22 PROCEDURE — 6360000002 HC RX W HCPCS: Performed by: EMERGENCY MEDICINE

## 2025-02-22 PROCEDURE — 85025 COMPLETE CBC W/AUTO DIFF WBC: CPT

## 2025-02-22 PROCEDURE — 2500000003 HC RX 250 WO HCPCS: Performed by: PHYSICIAN ASSISTANT

## 2025-02-22 PROCEDURE — 80307 DRUG TEST PRSMV CHEM ANLYZR: CPT

## 2025-02-22 PROCEDURE — 94761 N-INVAS EAR/PLS OXIMETRY MLT: CPT

## 2025-02-22 PROCEDURE — 85610 PROTHROMBIN TIME: CPT

## 2025-02-22 PROCEDURE — 96375 TX/PRO/DX INJ NEW DRUG ADDON: CPT

## 2025-02-22 RX ORDER — ACETAMINOPHEN 325 MG/1
650 TABLET ORAL EVERY 6 HOURS PRN
Status: DISCONTINUED | OUTPATIENT
Start: 2025-02-22 | End: 2025-02-23 | Stop reason: HOSPADM

## 2025-02-22 RX ORDER — ONDANSETRON 2 MG/ML
4 INJECTION INTRAMUSCULAR; INTRAVENOUS EVERY 30 MIN PRN
Status: DISCONTINUED | OUTPATIENT
Start: 2025-02-22 | End: 2025-02-23 | Stop reason: HOSPADM

## 2025-02-22 RX ORDER — PANTOPRAZOLE SODIUM 40 MG/10ML
40 INJECTION, POWDER, LYOPHILIZED, FOR SOLUTION INTRAVENOUS ONCE
Status: COMPLETED | OUTPATIENT
Start: 2025-02-22 | End: 2025-02-22

## 2025-02-22 RX ORDER — MORPHINE SULFATE 4 MG/ML
4 INJECTION, SOLUTION INTRAMUSCULAR; INTRAVENOUS EVERY 30 MIN PRN
Status: DISCONTINUED | OUTPATIENT
Start: 2025-02-22 | End: 2025-02-22

## 2025-02-22 RX ORDER — ASPIRIN 325 MG
325 TABLET ORAL ONCE
Status: COMPLETED | OUTPATIENT
Start: 2025-02-22 | End: 2025-02-22

## 2025-02-22 RX ORDER — 0.9 % SODIUM CHLORIDE 0.9 %
1000 INTRAVENOUS SOLUTION INTRAVENOUS ONCE
Status: COMPLETED | OUTPATIENT
Start: 2025-02-22 | End: 2025-02-22

## 2025-02-22 RX ORDER — POTASSIUM CHLORIDE 7.45 MG/ML
10 INJECTION INTRAVENOUS PRN
Status: DISCONTINUED | OUTPATIENT
Start: 2025-02-22 | End: 2025-02-23 | Stop reason: HOSPADM

## 2025-02-22 RX ORDER — DEXTROSE MONOHYDRATE 100 MG/ML
INJECTION, SOLUTION INTRAVENOUS CONTINUOUS PRN
Status: DISCONTINUED | OUTPATIENT
Start: 2025-02-22 | End: 2025-02-23 | Stop reason: HOSPADM

## 2025-02-22 RX ORDER — ONDANSETRON 2 MG/ML
4 INJECTION INTRAMUSCULAR; INTRAVENOUS EVERY 6 HOURS PRN
Status: DISCONTINUED | OUTPATIENT
Start: 2025-02-22 | End: 2025-02-23 | Stop reason: HOSPADM

## 2025-02-22 RX ORDER — PANTOPRAZOLE SODIUM 40 MG/10ML
40 INJECTION, POWDER, LYOPHILIZED, FOR SOLUTION INTRAVENOUS 2 TIMES DAILY
Status: DISCONTINUED | OUTPATIENT
Start: 2025-02-22 | End: 2025-02-23 | Stop reason: HOSPADM

## 2025-02-22 RX ORDER — IOPAMIDOL 755 MG/ML
100 INJECTION, SOLUTION INTRAVASCULAR
Status: COMPLETED | OUTPATIENT
Start: 2025-02-22 | End: 2025-02-22

## 2025-02-22 RX ORDER — DULAGLUTIDE 0.75 MG/.5ML
0.75 INJECTION, SOLUTION SUBCUTANEOUS WEEKLY
COMMUNITY

## 2025-02-22 RX ORDER — ASPIRIN 81 MG/1
324 TABLET, CHEWABLE ORAL ONCE
Status: DISCONTINUED | OUTPATIENT
Start: 2025-02-22 | End: 2025-02-22

## 2025-02-22 RX ORDER — GLUCAGON 1 MG/ML
1 KIT INJECTION PRN
Status: DISCONTINUED | OUTPATIENT
Start: 2025-02-22 | End: 2025-02-23 | Stop reason: HOSPADM

## 2025-02-22 RX ORDER — ACETAMINOPHEN 650 MG/1
650 SUPPOSITORY RECTAL EVERY 6 HOURS PRN
Status: DISCONTINUED | OUTPATIENT
Start: 2025-02-22 | End: 2025-02-23 | Stop reason: HOSPADM

## 2025-02-22 RX ORDER — SODIUM CHLORIDE 0.9 % (FLUSH) 0.9 %
5-40 SYRINGE (ML) INJECTION PRN
Status: DISCONTINUED | OUTPATIENT
Start: 2025-02-22 | End: 2025-02-23 | Stop reason: HOSPADM

## 2025-02-22 RX ORDER — ONDANSETRON 4 MG/1
4 TABLET, ORALLY DISINTEGRATING ORAL EVERY 8 HOURS PRN
Status: DISCONTINUED | OUTPATIENT
Start: 2025-02-22 | End: 2025-02-23 | Stop reason: HOSPADM

## 2025-02-22 RX ORDER — MORPHINE SULFATE 2 MG/ML
2 INJECTION, SOLUTION INTRAMUSCULAR; INTRAVENOUS EVERY 4 HOURS PRN
Status: DISCONTINUED | OUTPATIENT
Start: 2025-02-22 | End: 2025-02-23 | Stop reason: HOSPADM

## 2025-02-22 RX ORDER — INSULIN LISPRO 100 [IU]/ML
0-8 INJECTION, SOLUTION INTRAVENOUS; SUBCUTANEOUS
Status: DISCONTINUED | OUTPATIENT
Start: 2025-02-22 | End: 2025-02-23 | Stop reason: HOSPADM

## 2025-02-22 RX ORDER — SODIUM CHLORIDE 0.9 % (FLUSH) 0.9 %
5-40 SYRINGE (ML) INJECTION EVERY 12 HOURS SCHEDULED
Status: DISCONTINUED | OUTPATIENT
Start: 2025-02-22 | End: 2025-02-23 | Stop reason: HOSPADM

## 2025-02-22 RX ORDER — POLYETHYLENE GLYCOL 3350 17 G/17G
17 POWDER, FOR SOLUTION ORAL DAILY PRN
Status: DISCONTINUED | OUTPATIENT
Start: 2025-02-22 | End: 2025-02-23 | Stop reason: HOSPADM

## 2025-02-22 RX ORDER — INSULIN LISPRO 100 [IU]/ML
0-4 INJECTION, SOLUTION INTRAVENOUS; SUBCUTANEOUS
Status: DISCONTINUED | OUTPATIENT
Start: 2025-02-22 | End: 2025-02-22

## 2025-02-22 RX ORDER — POTASSIUM CHLORIDE 1500 MG/1
40 TABLET, EXTENDED RELEASE ORAL PRN
Status: DISCONTINUED | OUTPATIENT
Start: 2025-02-22 | End: 2025-02-23 | Stop reason: HOSPADM

## 2025-02-22 RX ORDER — MAGNESIUM SULFATE IN WATER 40 MG/ML
2000 INJECTION, SOLUTION INTRAVENOUS PRN
Status: DISCONTINUED | OUTPATIENT
Start: 2025-02-22 | End: 2025-02-23 | Stop reason: HOSPADM

## 2025-02-22 RX ORDER — SODIUM CHLORIDE 9 MG/ML
INJECTION, SOLUTION INTRAVENOUS PRN
Status: DISCONTINUED | OUTPATIENT
Start: 2025-02-22 | End: 2025-02-23 | Stop reason: HOSPADM

## 2025-02-22 RX ADMIN — SODIUM CHLORIDE, PRESERVATIVE FREE 10 ML: 5 INJECTION INTRAVENOUS at 21:11

## 2025-02-22 RX ADMIN — SODIUM CHLORIDE 1000 ML: 9 INJECTION, SOLUTION INTRAVENOUS at 09:22

## 2025-02-22 RX ADMIN — INSULIN LISPRO 3 UNITS: 100 INJECTION, SOLUTION INTRAVENOUS; SUBCUTANEOUS at 16:22

## 2025-02-22 RX ADMIN — ASPIRIN 325 MG: 325 TABLET ORAL at 11:01

## 2025-02-22 RX ADMIN — SODIUM CHLORIDE 1000 ML: 9 INJECTION, SOLUTION INTRAVENOUS at 11:01

## 2025-02-22 RX ADMIN — ONDANSETRON 4 MG: 2 INJECTION INTRAMUSCULAR; INTRAVENOUS at 09:23

## 2025-02-22 RX ADMIN — PANTOPRAZOLE SODIUM 40 MG: 40 INJECTION, POWDER, FOR SOLUTION INTRAVENOUS at 21:11

## 2025-02-22 RX ADMIN — IOPAMIDOL 100 ML: 755 INJECTION, SOLUTION INTRAVENOUS at 09:31

## 2025-02-22 RX ADMIN — MORPHINE SULFATE 4 MG: 4 INJECTION, SOLUTION INTRAMUSCULAR; INTRAVENOUS at 09:23

## 2025-02-22 RX ADMIN — MORPHINE SULFATE 2 MG: 2 INJECTION, SOLUTION INTRAMUSCULAR; INTRAVENOUS at 22:30

## 2025-02-22 RX ADMIN — INSULIN LISPRO 6 UNITS: 100 INJECTION, SOLUTION INTRAVENOUS; SUBCUTANEOUS at 21:10

## 2025-02-22 RX ADMIN — LIDOCAINE HYDROCHLORIDE: 20 SOLUTION ORAL at 16:16

## 2025-02-22 RX ADMIN — PANTOPRAZOLE SODIUM 40 MG: 40 INJECTION, POWDER, FOR SOLUTION INTRAVENOUS at 09:25

## 2025-02-22 ASSESSMENT — PAIN SCALES - GENERAL
PAINLEVEL_OUTOF10: 10
PAINLEVEL_OUTOF10: 6
PAINLEVEL_OUTOF10: 6

## 2025-02-22 ASSESSMENT — PAIN DESCRIPTION - LOCATION
LOCATION: STERNUM
LOCATION: CHEST
LOCATION: CHEST

## 2025-02-22 ASSESSMENT — PAIN DESCRIPTION - ORIENTATION: ORIENTATION: LEFT

## 2025-02-22 ASSESSMENT — PAIN - FUNCTIONAL ASSESSMENT
PAIN_FUNCTIONAL_ASSESSMENT: ACTIVITIES ARE NOT PREVENTED
PAIN_FUNCTIONAL_ASSESSMENT: INTOLERABLE, UNABLE TO DO ANY ACTIVE OR PASSIVE ACTIVITIES
PAIN_FUNCTIONAL_ASSESSMENT: 0-10

## 2025-02-22 ASSESSMENT — PAIN DESCRIPTION - PAIN TYPE: TYPE: ACUTE PAIN

## 2025-02-22 ASSESSMENT — PAIN DESCRIPTION - DESCRIPTORS
DESCRIPTORS: SHARP
DESCRIPTORS: STABBING

## 2025-02-22 ASSESSMENT — HEART SCORE: ECG: NON-SPECIFC REPOLARIZATION DISTURBANCE/LBTB/PM

## 2025-02-22 NOTE — ED NOTES
Answered call light, pt asking about admit wait time, updated admit situation, pt requesting to talk to Dr Muñoz updated  Call light within reach

## 2025-02-22 NOTE — H&P
V2.0  History and Physical      Name:  Alejandro Gamez /Age/Sex: 1975  (49 y.o. male)   MRN & CSN:  [id number] & [id number] Encounter Date/Time: 2025 2:45 PM EST   Location:  [facility]  PCP: Nash Swann PA       Hospital Day: 1    Assessment and Plan:   Alejandro Gamez is a 49 y.o. male with a past medical history of diabetes, hyperlipidemia, hypertension who presents with Chest pain.    Chest Pain, likely related to GERD/esophagitis   - presented with central sharp chest pain associated with nausea, vomiting  - EKG with no acute ST changes, TWI in V4-V6, no previous to compare.  - trop T neg x 2  - CTPA no PE  -Low suspicion for ACS, was given aspirin 325 mg at OSH.  Will hold on further antiplatelet given concerns for possible GI bleed.  - trend trop, monitor on tele   -Continue PPI, trial GI cocktail.  IV morphine for severe pain.  -Repeat EKG  -GI consulted, appreciate recs  - cardiology consulted given EKG changes, appreciate recs     Nausea/vomiting  -Likely related to above.  Patient did report episodes of coffee-ground emesis and dark tarry stool.  H&H is stable.  -occasional NSAID use  -Repeat H&H pending  -Continue IV PPI twice daily, as needed antiemetics  -GI consulted, appreciate recs    History of cocaine use  -Patient denies on admission  -Check UDS    T2DM w/ hyperglycemia  -Managed on Trulicity and metformin, held  -Low-dose SSI  -Monitor POCT glucose  -Hypoglycemia management protocol    History of GERD  -Continue PPI    This patient was seen and examined in conjunction with Dr. Hobbs. He was agreeable with the assessment and plan as dictated above.     Hospital Problems             Last Modified POA    * (Principal) Chest pain 2025 Yes       Disposition:   Current Living situation: home  Expected Disposition: same  Estimated D/C: 24 hours    Diet ADULT DIET; Regular  Diet NPO   DVT Prophylaxis [] Lovenox, []  Heparin, [] SCDs, [x] Ambulation,  [] Eliquis, [] Xarelto

## 2025-02-22 NOTE — ED PROVIDER NOTES
ascending aorta 3.9 cm. No dissection or acute aortic abnormality. 4.  Incidental findings include mild splenomegaly, gynecomastia, and thyromegaly. This dictation was created with voice recognition software.  While attempts have  been made to review the dictation as it is transcribed, on occasion the spoken word can be misinterpreted by the technology leading to omissions or inappropriate words, phrases or sentences.  Dictated and Electronically Signed By: Rufina Duran MD 2/22/2025 10:16           CC/HPI Summary, DDx, ED Course, and Reassessment:     Patient presents with chest pain and recurrent vomiting for the past several days.  Appears quite uncomfortable and is tachycardic on arrival.  Initial EKG reveals diffuse T wave inversions that appear new compared to prior tracing.  Patient is given morphine and Zofran with control of his symptoms.  Given his description of pain and overall appearance on arrival, sent for CTA chest.  There is no evidence of PE or other acute vascular abnormality.  Patient does have some mild thickening of the esophagus noted per radiology.    Patient does report seeing black material in both his emesis and stool.  No vomiting in the ED.  H&H is normal.  Some consideration that this is GI related.  Patient covered with Protonix.    Chemistries notable for elevated glucose of 420.  Anion gap is minimally elevated at 17.  Bicarb normal at 25.  No leukocytosis.  Patient is given IV fluids while in the ED.  Glucose downtrending on recheck.      Patient initial and repeat troponins are not elevated with levels of 14 and 15 respectively.  Given aspirin once vomiting controlled.       History from : Patient      Patient was given the following medications:  Medications   ondansetron (ZOFRAN) injection 4 mg (4 mg IntraVENous Given 2/22/25 0923)   morphine sulfate (PF) injection 4 mg (4 mg IntraVENous Given 2/22/25 0923)   sodium chloride flush 0.9 % injection 5-40 mL (has no administration in

## 2025-02-22 NOTE — ED NOTES
ED TO INPATIENT SBAR HANDOFF    Patient Name: Alejandro Gamez   :  1975  49 y.o.   Preferred Name    Family/Caregiver Present no   Restraints no   C-SSRS: Risk of Suicide: No Risk  Sitter no   Sepsis Risk Score        Situation  Chief Complaint   Patient presents with    Vomiting    Chest Pain     Brief Description of Patient's Condition: stable decreased esophogeal pain.   Mental Status: alert  Arrived from: home    Imaging:   CT CHEST PULMONARY EMBOLISM W CONTRAST   Final Result        Abnormal labs:   Abnormal Labs Reviewed   COMPREHENSIVE METABOLIC PANEL - Abnormal; Notable for the following components:       Result Value    Chloride 93 (*)     Anion Gap 17 (*)     Glucose 420 (*)     All other components within normal limits   CBC WITH AUTO DIFFERENTIAL - Abnormal; Notable for the following components:    MCH 31.5 (*)     Monocytes % 7 (*)     All other components within normal limits   POCT GLUCOSE - Abnormal; Notable for the following components:    POC Glucose 373 (*)     All other components within normal limits        Background  History:   Past Medical History:   Diagnosis Date    Diabetes mellitus (HCC)     Hyperlipidemia     Hypertension        Assessment    Vitals:    Level of Consciousness: Alert (0)   Vitals:    25 1030 25 1045 25 1115 25 1230   BP: 137/75 135/69 (!) 147/97 132/75   Pulse: 82 83 84 83   Resp: 14 13 18 17   Temp:       SpO2: 94% 96% 99% 98%   Weight:       Height:         PO Status: Regular  O2 Flow Rate: O2 Device: None (Room air)    Cardiac Rhythm: NORMAL SINUS  Last documented pain medication administered: 1101  NIH Score: NIH     Active LDA's:   Peripheral IV 25 Right Antecubital (Active)       Pertinent or High Risk Medications/Drips: no   If Yes, please provide details:   Blood Product Administration: no  If Yes, please provide details:     Recommendation    Incomplete orders   Additional Comments:  [facility] eta 1315  If any further

## 2025-02-22 NOTE — PROGRESS NOTES
I discussed the patient with the MARY and was available for questions and consultation as needed. I personally saw the patient and made/approved the management plan and take responsibility for the patient management today.    History:   Patient states that yesterday he woke up from sleep with chest pain.  He describes it as a burning sensation middle of his chest.  It comes and goes.  Does not radiate.  Nothing helps.  Nothing makes it worse.  He has vomited.  He denies any fever.  He also has had diarrhea.  States his vomit was dark and his stool was also dark.  No gross blood seen.  Said he had some shortness of breath.  He also had palpitations.  Alcohol about twice a week with his last drink being 3 to 4 days ago.  He does smoke.  He also has diabetes.  Denies high blood pressure or a family history of MI.    Exam:   No acute distress  S1-S2 regular rate and rhythm  No tenderness to palpation of the chest  Clear to auscultation bilaterally breath sounds positive  Abdomen soft nontender nondistended  No pitting edema in lower extremities nontender      MDM:   -Chest pain: EKG shows inverted T waves in V4-V6.  Check serial troponins. May be GI related, however due to EKG findings consult cardiology.  Repeat EKG.  Check UDS.  - Possible hematemesis: Patient states he had dark emesis and dark stool.  Check occult stool.  Consult GI.  Placed on PPI.  Hemoglobin at this time is not low.  Check serial hemoglobins.  - Diabetes mellitus: Placed on sliding scale insulin      Maria Teresa Hobbs MD  2/22/25

## 2025-02-22 NOTE — PROGRESS NOTES
4 Eyes Skin Assessment     NAME:  Alejandro Gamez  YOB: 1975  MEDICAL RECORD NUMBER:  [id number]    The patient is being assessed for  Admission    I agree that at least one RN has performed a thorough Head to Toe Skin Assessment on the patient. ALL assessment sites listed below have been assessed.      Areas assessed by both nurses:    Head, Face, Ears, Shoulders, Back, Chest, Arms, Elbows, Hands, Sacrum. Buttock, Coccyx, Ischium, Legs. Feet and Heels, and Under Medical Devices         Does the Patient have a Wound? No noted wound(s)       Sigifredo Prevention initiated by RN: No  Wound Care Orders initiated by RN: No    Pressure Injury (Stage 3,4, Unstageable, DTI, NWPT, and Complex wounds) if present, place Wound referral order by RN under : No    New Ostomies, if present place, Ostomy referral order under : No     Nurse 1 eSignature: Electronically signed by Angeli Holland RN on 2/22/25 at 4:34 PM EST    **SHARE this note so that the co-signing nurse can place an eSignature**    Nurse 2 eSignature: {Esignature:[id number]}

## 2025-02-22 NOTE — CONSULTS
CARDIOLOGY CONSULT NOTE   Reason for consultation: Chest pain    Referring physician:  Maria Teresa Hobbs MD     Primary care physician: Nash Swann PA      Dear  Dr. Maria Teresa Hobbs MD   Thanks for the consult.    Chief Complaints :  Chief Complaint   Patient presents with    Vomiting    Chest Pain        History of present illness:Alejandro is a 49 y.o.year old who presents with concerns of severe pain in the epigastrium in the middle of the chest which gets worse with deep inspiration or when he is drinking.  EKG showed nonspecific T wave inversions in precordial leads although serial troponins are negative.  He says the pain was 10 out of 10 CT chest did not show acute PE however pain is worse when he takes a deep sponge catch his breath with the pain score going up to 10 out of 10.  He did have some nausea and vomiting he has history of cocaine use but currently denies it denies any previous cardiac    Past medical history:    has a past medical history of Diabetes mellitus (HCC), Hyperlipidemia, and Hypertension.  Past surgical history:   has no past surgical history on file.  Social History:   reports that he has been smoking. He has never used smokeless tobacco. He reports current alcohol use. He reports current drug use. Drug: Marijuana (Weed).  Family history:   no family history of CAD, STROKE of DM at early age    No Known Allergies    ondansetron (ZOFRAN) injection 4 mg, Q30 Min PRN  sodium chloride flush 0.9 % injection 5-40 mL, 2 times per day  sodium chloride flush 0.9 % injection 5-40 mL, PRN  0.9 % sodium chloride infusion, PRN  potassium chloride (KLOR-CON M) extended release tablet 40 mEq, PRN   Or  potassium bicarb-citric acid (EFFER-K) effervescent tablet 40 mEq, PRN   Or  potassium chloride 10 mEq/100 mL IVPB (Peripheral Line), PRN  magnesium sulfate 2000 mg in 50 mL IVPB premix, PRN  [Held by provider] ondansetron (ZOFRAN-ODT) disintegrating tablet 4 mg, Q8H PRN   Or  [Held by provider]

## 2025-02-23 VITALS
TEMPERATURE: 98.4 F | DIASTOLIC BLOOD PRESSURE: 96 MMHG | OXYGEN SATURATION: 95 % | BODY MASS INDEX: 32.2 KG/M2 | SYSTOLIC BLOOD PRESSURE: 146 MMHG | WEIGHT: 230 LBS | HEART RATE: 68 BPM | HEIGHT: 71 IN | RESPIRATION RATE: 16 BRPM

## 2025-02-23 LAB
ALBUMIN SERPL-MCNC: 3.7 G/DL (ref 3.4–5)
ALBUMIN/GLOB SERPL: 1.8 {RATIO} (ref 1.1–2.2)
ALP SERPL-CCNC: 54 U/L (ref 40–129)
ALT SERPL-CCNC: 27 U/L (ref 10–40)
AMPHET UR QL SCN: NEGATIVE
ANION GAP SERPL CALCULATED.3IONS-SCNC: 9 MMOL/L (ref 9–17)
AST SERPL-CCNC: 21 U/L (ref 15–37)
BARBITURATES UR QL SCN: NEGATIVE
BASOPHILS # BLD: 0.06 K/UL
BASOPHILS NFR BLD: 1 % (ref 0–1)
BENZODIAZ UR QL: NEGATIVE
BILIRUB SERPL-MCNC: 0.5 MG/DL (ref 0–1)
BUN SERPL-MCNC: 18 MG/DL (ref 7–20)
CALCIUM SERPL-MCNC: 8.9 MG/DL (ref 8.3–10.6)
CANNABINOIDS UR QL SCN: POSITIVE
CHLORIDE SERPL-SCNC: 103 MMOL/L (ref 99–110)
CO2 SERPL-SCNC: 26 MMOL/L (ref 21–32)
COCAINE UR QL SCN: POSITIVE
CREAT SERPL-MCNC: 1 MG/DL (ref 0.9–1.3)
EOSINOPHIL # BLD: 0.3 K/UL
EOSINOPHILS RELATIVE PERCENT: 4 % (ref 0–3)
ERYTHROCYTE [DISTWIDTH] IN BLOOD BY AUTOMATED COUNT: 12 % (ref 11.7–14.9)
EST. AVERAGE GLUCOSE BLD GHB EST-MCNC: 238 MG/DL
FENTANYL UR QL: NEGATIVE
GFR, ESTIMATED: 84 ML/MIN/1.73M2
GLUCOSE BLD-MCNC: 252 MG/DL (ref 74–99)
GLUCOSE BLD-MCNC: 260 MG/DL (ref 74–99)
GLUCOSE SERPL-MCNC: 252 MG/DL (ref 74–99)
HBA1C MFR BLD: 9.9 % (ref 4.2–6.3)
HCT VFR BLD AUTO: 41.1 % (ref 42–52)
HGB BLD-MCNC: 14.5 G/DL (ref 13.5–18)
IMM GRANULOCYTES # BLD AUTO: 0.04 K/UL
IMM GRANULOCYTES NFR BLD: 1 %
LYMPHOCYTES NFR BLD: 2.64 K/UL
LYMPHOCYTES RELATIVE PERCENT: 34 % (ref 24–44)
MCH RBC QN AUTO: 31.7 PG (ref 27–31)
MCHC RBC AUTO-ENTMCNC: 35.3 G/DL (ref 32–36)
MCV RBC AUTO: 89.9 FL (ref 78–100)
MONOCYTES NFR BLD: 0.49 K/UL
MONOCYTES NFR BLD: 6 % (ref 0–4)
NEUTROPHILS NFR BLD: 55 % (ref 36–66)
NEUTS SEG NFR BLD: 4.24 K/UL
OPIATES UR QL SCN: POSITIVE
OXYCODONE UR QL SCN: NEGATIVE
PLATELET # BLD AUTO: 179 K/UL (ref 140–440)
PMV BLD AUTO: 8.7 FL (ref 7.5–11.1)
POTASSIUM SERPL-SCNC: 3.9 MMOL/L (ref 3.5–5.1)
PROT SERPL-MCNC: 5.9 G/DL (ref 6.4–8.2)
RBC # BLD AUTO: 4.57 M/UL (ref 4.6–6.2)
SODIUM SERPL-SCNC: 139 MMOL/L (ref 136–145)
TEST INFORMATION: ABNORMAL
TROPONIN I SERPL HS-MCNC: 11 NG/L (ref 0–22)
WBC OTHER # BLD: 7.8 K/UL (ref 4–10.5)

## 2025-02-23 PROCEDURE — 94761 N-INVAS EAR/PLS OXIMETRY MLT: CPT

## 2025-02-23 PROCEDURE — 96376 TX/PRO/DX INJ SAME DRUG ADON: CPT

## 2025-02-23 PROCEDURE — G0378 HOSPITAL OBSERVATION PER HR: HCPCS

## 2025-02-23 PROCEDURE — 36415 COLL VENOUS BLD VENIPUNCTURE: CPT

## 2025-02-23 PROCEDURE — 80053 COMPREHEN METABOLIC PANEL: CPT

## 2025-02-23 PROCEDURE — 6370000000 HC RX 637 (ALT 250 FOR IP): Performed by: HOSPITALIST

## 2025-02-23 PROCEDURE — 83036 HEMOGLOBIN GLYCOSYLATED A1C: CPT

## 2025-02-23 PROCEDURE — 6360000002 HC RX W HCPCS: Performed by: PHYSICIAN ASSISTANT

## 2025-02-23 PROCEDURE — 85025 COMPLETE CBC W/AUTO DIFF WBC: CPT

## 2025-02-23 PROCEDURE — 82962 GLUCOSE BLOOD TEST: CPT

## 2025-02-23 RX ORDER — PANTOPRAZOLE SODIUM 40 MG/1
40 TABLET, DELAYED RELEASE ORAL 2 TIMES DAILY
Qty: 60 TABLET | Refills: 0 | Status: SHIPPED | OUTPATIENT
Start: 2025-02-23

## 2025-02-23 RX ADMIN — INSULIN LISPRO 4 UNITS: 100 INJECTION, SOLUTION INTRAVENOUS; SUBCUTANEOUS at 10:06

## 2025-02-23 RX ADMIN — PANTOPRAZOLE SODIUM 40 MG: 40 INJECTION, POWDER, FOR SOLUTION INTRAVENOUS at 10:04

## 2025-02-23 NOTE — PROGRESS NOTES
Cardiology Progress Note     Admit Date:  2/22/2025    Consult reason/ Seen today for :       Subjective and  Overnight Events : He says he was vomiting and retching chest pain is improved now  Blood sugars are elevated serial troponins are normal    Chief complain on admission : 49 y.o.year old who is admitted for  Chief Complaint   Patient presents with    Vomiting    Chest Pain      Assessment / Plan:  Chest Pain: Serial troponins are normal ruled out for ACS  Gastroenterology evaluation  We will get stress test as outpatient I have made arrangements for him to get stress test as outpatient  6.   Dyslipidemia: Check lipid panel  Discussed with primary team, hospitalist service, bedside nursing staff and family  Past medical history:    has a past medical history of Diabetes mellitus (HCC), Hyperlipidemia, and Hypertension.  Past surgical history:   has no past surgical history on file.  Social History:   reports that he has been smoking. He has never used smokeless tobacco. He reports current alcohol use. He reports current drug use. Drug: Marijuana (Weed).  Family history:  family history is not on file.    No Known Allergies    Review of Systems:    All 14 systems were reviewed and are negative  Except for the positive findings  which as documented     /82   Pulse 64   Temp 98.4 °F (36.9 °C) (Oral)   Resp 15   Ht 1.803 m (5' 11\")   Wt 104.3 kg (230 lb)   SpO2 98%   BMI 32.08 kg/m²     Intake/Output Summary (Last 24 hours) at 2/23/2025 0937  Last data filed at 2/22/2025 1646  Gross per 24 hour   Intake 260 ml   Output --   Net 260 ml     Physical Exam:  Constitutional:  Well developed, Well nourished, No acute distress, Non-toxic appearance.   HENT:  Normocephalic, Atraumatic, Bilateral external ears normal, Oropharynx moist, No oral exudates, Nose normal. Neck-  Supple, No stridor.   Eyes:  PERRL, EOMI, Conjunctiva

## 2025-02-23 NOTE — DISCHARGE SUMMARY
V2.0  Discharge Summary    Name:  Alejandro Gamez /Age/Sex: 1975 (49 y.o. male)   Admit Date: 2025  Discharge Date: 25    MRN & CSN:  [id number] & [id number] Encounter Date and Time 25 11:54 AM EST    Attending:  Maria Teresa Hobbs MD Discharging Provider: Flakita Norirs PA-C       Hospital Course:     Brief HPI: Alejandro Gamez is a 49 y.o. male who presented with chest pain.     Problems addressed during this hospitalization:     Chest Pain, likely related to GERD/esophagitis - resolved  - presented with central sharp chest pain associated with nausea, vomiting  - EKG with no acute ST changes, TWI in V4-V6, no previous to compare.  - trop T neg x 3  - CTPA no PE  -Low suspicion for ACS, was given aspirin 325 mg at OSH.  Will hold on further antiplatelet given concerns for possible GI bleed.  - Cardio consulted, recommended outpatient stress test. Cleared for discharge.   -GI was consulted however patient requested discharge prior to GI evaluation as symptoms have resolved.  GI referral as outpatient.     Nausea/vomiting  -Likely related to above.  Patient did report episodes of coffee-ground emesis and dark tarry stool.    -occasional NSAID use  - Hgb trended down slightly, but no further episodes of dark stool or hematemesis.  Patient did receive IV fluids so may be hemodilutional.  -GI was consulted, but patient requested discharge prior to GI evaluation.  Referred to GI as outpatient.  -Stop Prilosec.  Start Protonix twice daily.  Repeat H&H in 3 to 5 days as outpatient.  -Tolerating regular diet on discharge    History of cocaine use  - strongly encouraged cessation     T2DM w/ hyperglycemia  -Managed on Trulicity only  - hemoglobin A1c 9.9  -Will restart metformin on discharge  -Discussed importance of lifestyle modifications and follow-up with PCP.  May need to consider starting insulin, will defer to PCP.    History of GERD  -Continue PPI    This patient was discussed in conjunction with

## 2025-02-23 NOTE — DISCHARGE INSTRUCTIONS
Avoid alcohol and illicit substances     Your hemoglobin A1c is 9.9.  Your goal is to be closer to 7.  Follow-up with your primary care doctor.  Restart taking metformin.    Stop taking Prilosec and start taking pantoprazole twice daily.  Is best to take this medication on an empty stomach for her to work effectively.    Have your hemoglobin rechecked in 3 to 5 days and have the results sent to your primary care provider.  This will assess for bleeding.    Avoid NSAIDs including ibuprofen, Advil, naproxen, Aleve.

## 2025-02-26 ENCOUNTER — TELEPHONE (OUTPATIENT)
Dept: GASTROENTEROLOGY | Age: 50
End: 2025-02-26

## 2025-03-05 ENCOUNTER — TELEPHONE (OUTPATIENT)
Dept: GASTROENTEROLOGY | Age: 50
End: 2025-03-05

## 2025-03-05 NOTE — TELEPHONE ENCOUNTER
LM for the patient to call back to schedule a new patient appt with Dr. Rollins for GERD and Non-cardiac chest pain.

## 2025-03-07 ENCOUNTER — TELEPHONE (OUTPATIENT)
Dept: CARDIOLOGY CLINIC | Age: 50
End: 2025-03-07

## 2025-03-07 NOTE — TELEPHONE ENCOUNTER
----- Message from CARMEN SARMIENTO MA sent at 3/6/2025  8:59 AM EST -----  Regarding: FW: stress test  Pt needs treadmill stress test please  ----- Message -----  From: Kiran Locke MD  Sent: 2/23/2025   9:39 AM EST  To: Bernie Mcghee MA; #  Subject: stress test                                      Please arrange stress test in next 1 week   Plain treadmill is ok  Left a message to call the office to arrange treadmill

## 2025-03-11 ENCOUNTER — TELEPHONE (OUTPATIENT)
Dept: GASTROENTEROLOGY | Age: 50
End: 2025-03-11

## 2025-03-11 NOTE — TELEPHONE ENCOUNTER
LM for the patient to call back to schedule a new patient appt for GERD and chest pain with Dr. Rollins.